# Patient Record
Sex: FEMALE
[De-identification: names, ages, dates, MRNs, and addresses within clinical notes are randomized per-mention and may not be internally consistent; named-entity substitution may affect disease eponyms.]

---
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
## 2023-01-27 ENCOUNTER — OFFICE (OUTPATIENT)
Dept: URBAN - METROPOLITAN AREA CLINIC 104 | Facility: CLINIC | Age: 18
Setting detail: OPHTHALMOLOGY
End: 2023-01-27
Payer: MEDICAID

## 2023-01-27 DIAGNOSIS — H20.823: ICD-10-CM

## 2023-01-27 DIAGNOSIS — H16.222: ICD-10-CM

## 2023-01-27 DIAGNOSIS — H25.13: ICD-10-CM

## 2023-01-27 DIAGNOSIS — H25.12: ICD-10-CM

## 2023-01-27 DIAGNOSIS — H44.113: ICD-10-CM

## 2023-01-27 PROBLEM — H21.543 POSTERIOR SYNECHIAE OF IRIS; BOTH EYES: Status: ACTIVE | Noted: 2023-01-27

## 2023-01-27 PROBLEM — H25.11 CATARACT SENILE NUCLEAR SCLEROSIS; RIGHT EYE, LEFT EYE, BOTH EYES: Status: ACTIVE | Noted: 2023-01-27

## 2023-01-27 PROBLEM — H26.20 COMPLICATED CATARACT, NOS ; LEFT EYE: Status: ACTIVE | Noted: 2023-01-27

## 2023-01-27 PROCEDURE — 76519 ECHO EXAM OF EYE: CPT | Performed by: OPHTHALMOLOGY

## 2023-01-27 PROCEDURE — 99214 OFFICE O/P EST MOD 30 MIN: CPT | Performed by: OPHTHALMOLOGY

## 2023-01-27 ASSESSMENT — REFRACTION_MANIFEST
OD_SPHERE: PLANO
OS_SPHERE: PLANO
OD_VA1: 20/CF
OS_VA1: 20/HM

## 2023-01-27 ASSESSMENT — KERATOMETRY
OD_K1POWER_DIOPTERS: 42.56
OS_K2POWER_DIOPTERS: 46.17
OD_K2POWER_DIOPTERS: 46.49
OD_K1K2_AVERAGE: 44.525
OS_CYLPOWER_DEGREES: 3.39
OD_AXISANGLE_DEGREES: 172
OS_K1K2_AVERAGE: 44.475
OS_AXISANGLE_DEGREES: 180
OD_CYLPOWER_DEGREES: 3.93
OS_K1POWER_DIOPTERS: 42.78
OS_CYLAXISANGLE_DEGREES: 90
OS_AXISANGLE2_DEGREES: 90
OD_AXISANGLE2_DEGREES: 82
OD_CYLAXISANGLE_DEGREES: 82

## 2023-01-27 ASSESSMENT — VISUAL ACUITY
OD_BCVA: 20/HM
OS_BCVA: 20/CF

## 2023-01-27 ASSESSMENT — CONFRONTATIONAL VISUAL FIELD TEST (CVF)
OS_FINDINGS: FULL
OD_FINDINGS: FULL

## 2023-01-27 ASSESSMENT — SUPERFICIAL PUNCTATE KERATITIS (SPK): OS_SPK: 2+

## 2023-02-27 ENCOUNTER — OFFICE (OUTPATIENT)
Dept: URBAN - METROPOLITAN AREA CLINIC 104 | Facility: CLINIC | Age: 18
Setting detail: OPHTHALMOLOGY
End: 2023-02-27
Payer: MEDICAID

## 2023-02-27 DIAGNOSIS — H25.12: ICD-10-CM

## 2023-02-27 DIAGNOSIS — H44.113: ICD-10-CM

## 2023-02-27 DIAGNOSIS — H26.20: ICD-10-CM

## 2023-02-27 DIAGNOSIS — H25.13: ICD-10-CM

## 2023-02-27 DIAGNOSIS — H16.222: ICD-10-CM

## 2023-02-27 DIAGNOSIS — H25.11: ICD-10-CM

## 2023-02-27 DIAGNOSIS — H20.823: ICD-10-CM

## 2023-02-27 DIAGNOSIS — H21.543: ICD-10-CM

## 2023-02-27 PROCEDURE — 99213 OFFICE O/P EST LOW 20 MIN: CPT | Performed by: OPHTHALMOLOGY

## 2023-02-27 ASSESSMENT — KERATOMETRY
OS_K1POWER_DIOPTERS: 42.72
OS_AXISANGLE_DEGREES: 093
OD_K2POWER_DIOPTERS: 46.49
OD_K1POWER_DIOPTERS: 42.56
OS_K2POWER_DIOPTERS: 46.23
OD_AXISANGLE_DEGREES: 084

## 2023-02-27 ASSESSMENT — REFRACTION_MANIFEST
OS_SPHERE: PLANO
OD_SPHERE: PLANO
OS_VA1: 20/HM
OD_VA1: 20/CF

## 2023-02-27 ASSESSMENT — VISUAL ACUITY
OD_BCVA: 20/HM
OS_BCVA: 20/CF

## 2023-02-27 ASSESSMENT — CONFRONTATIONAL VISUAL FIELD TEST (CVF)
OD_FINDINGS: FULL
OS_FINDINGS: FULL

## 2023-02-27 ASSESSMENT — REFRACTION_AUTOREFRACTION
OS_SPHERE: UNABLE
OD_SPHERE: UNABLE

## 2023-02-27 ASSESSMENT — SUPERFICIAL PUNCTATE KERATITIS (SPK): OS_SPK: 2+

## 2023-03-15 ENCOUNTER — RX ONLY (RX ONLY)
Age: 18
End: 2023-03-15

## 2023-03-15 ENCOUNTER — AMBUL SURGICAL CARE (OUTPATIENT)
Dept: URBAN - METROPOLITAN AREA CLINIC 18 | Facility: CLINIC | Age: 18
Setting detail: OPHTHALMOLOGY
End: 2023-03-15
Payer: MEDICAID

## 2023-03-15 DIAGNOSIS — H21.543: ICD-10-CM

## 2023-03-15 DIAGNOSIS — H26.20: ICD-10-CM

## 2023-03-15 PROCEDURE — 66982 XCAPSL CTRC RMVL CPLX WO ECP: CPT | Performed by: OPHTHALMOLOGY

## 2023-03-15 PROCEDURE — 65865 INCISE INNER EYE ADHESIONS: CPT | Performed by: OPHTHALMOLOGY

## 2023-03-16 ENCOUNTER — OFFICE (OUTPATIENT)
Dept: URBAN - METROPOLITAN AREA CLINIC 104 | Facility: CLINIC | Age: 18
Setting detail: OPHTHALMOLOGY
End: 2023-03-16
Payer: MEDICAID

## 2023-03-16 DIAGNOSIS — Z96.1: ICD-10-CM

## 2023-03-16 PROCEDURE — 99024 POSTOP FOLLOW-UP VISIT: CPT | Performed by: OPTOMETRIST

## 2023-03-16 ASSESSMENT — CONFRONTATIONAL VISUAL FIELD TEST (CVF)
OS_FINDINGS: FULL
OD_FINDINGS: FULL

## 2023-03-16 ASSESSMENT — REFRACTION_MANIFEST
OD_VA1: 20/CF
OD_SPHERE: PLANO
OS_VA1: 20/HM
OS_SPHERE: PLANO

## 2023-03-16 ASSESSMENT — KERATOMETRY
OD_K1POWER_DIOPTERS: 42.56
OS_K1POWER_DIOPTERS: UNABLE
OD_K2POWER_DIOPTERS: 45.98
OD_AXISANGLE_DEGREES: 083

## 2023-03-16 ASSESSMENT — SUPERFICIAL PUNCTATE KERATITIS (SPK): OS_SPK: 2+

## 2023-03-16 ASSESSMENT — CORNEAL EDEMA - MICROCYSTIC EPITHELIAL EDEMA (MCE): OS_MCE: 2+ 3+

## 2023-03-16 ASSESSMENT — VISUAL ACUITY
OS_BCVA: 20/CF
OD_BCVA: 20/300

## 2023-03-16 ASSESSMENT — REFRACTION_AUTOREFRACTION
OS_SPHERE: UNABLE
OD_SPHERE: UNABLE

## 2023-03-20 ENCOUNTER — OFFICE (OUTPATIENT)
Dept: URBAN - METROPOLITAN AREA CLINIC 104 | Facility: CLINIC | Age: 18
Setting detail: OPHTHALMOLOGY
End: 2023-03-20
Payer: MEDICAID

## 2023-03-20 DIAGNOSIS — Z96.1: ICD-10-CM

## 2023-03-20 PROCEDURE — 99024 POSTOP FOLLOW-UP VISIT: CPT | Performed by: OPHTHALMOLOGY

## 2023-03-20 ASSESSMENT — REFRACTION_MANIFEST
OD_VA1: 20/CF
OD_SPHERE: PLANO

## 2023-03-20 ASSESSMENT — VISUAL ACUITY
OD_BCVA: 20/80
OS_BCVA: 20/CF

## 2023-03-20 ASSESSMENT — KERATOMETRY
OD_AXISANGLE_DEGREES: 084
OS_K2POWER_DIOPTERS: 46.55
OS_AXISANGLE_DEGREES: 097
OD_K1POWER_DIOPTERS: 42.78
OD_K2POWER_DIOPTERS: 46.62
OS_K1POWER_DIOPTERS: 42.67

## 2023-03-20 ASSESSMENT — CONFRONTATIONAL VISUAL FIELD TEST (CVF)
OS_FINDINGS: FULL
OD_FINDINGS: FULL

## 2023-03-20 ASSESSMENT — CORNEAL EDEMA - MICROCYSTIC EPITHELIAL EDEMA (MCE): OS_MCE: 2+ 3+

## 2023-03-20 ASSESSMENT — SUPERFICIAL PUNCTATE KERATITIS (SPK): OS_SPK: 2+

## 2023-03-20 ASSESSMENT — REFRACTION_AUTOREFRACTION
OS_AXIS: 007
OS_SPHERE: +1.00
OD_SPHERE: UNABLE
OS_CYLINDER: -2.75

## 2023-03-20 ASSESSMENT — SPHEQUIV_DERIVED: OS_SPHEQUIV: -0.375

## 2023-03-20 ASSESSMENT — TONOMETRY: OS_IOP_MMHG: 15

## 2023-03-20 ASSESSMENT — AXIALLENGTH_DERIVED: OS_AL: 23.3332

## 2023-04-03 ENCOUNTER — OFFICE (OUTPATIENT)
Dept: URBAN - METROPOLITAN AREA CLINIC 104 | Facility: CLINIC | Age: 18
Setting detail: OPHTHALMOLOGY
End: 2023-04-03
Payer: MEDICAID

## 2023-04-03 DIAGNOSIS — Z96.1: ICD-10-CM

## 2023-04-03 PROCEDURE — 99024 POSTOP FOLLOW-UP VISIT: CPT | Performed by: OPHTHALMOLOGY

## 2023-04-03 ASSESSMENT — KERATOMETRY
OD_K1POWER_DIOPTERS: 42.67
OS_K1POWER_DIOPTERS: 43.05
OS_AXISANGLE_DEGREES: 096
OD_AXISANGLE_DEGREES: 087
OS_K2POWER_DIOPTERS: 46.11
OD_K2POWER_DIOPTERS: 46.62

## 2023-04-03 ASSESSMENT — REFRACTION_MANIFEST
OD_VA1: 20/CF
OS_SPHERE: +1.00
OS_AXIS: 170
OD_SPHERE: PLANO
OS_CYLINDER: -2.25
OS_VA1: 20/40

## 2023-04-03 ASSESSMENT — REFRACTION_AUTOREFRACTION
OS_AXIS: 170
OD_SPHERE: UNABLE
OS_CYLINDER: -2.25
OS_SPHERE: +0.75

## 2023-04-03 ASSESSMENT — TONOMETRY
OS_IOP_MMHG: 14
OD_IOP_MMHG: 14

## 2023-04-03 ASSESSMENT — VISUAL ACUITY: OD_BCVA: 20/60+

## 2023-04-03 ASSESSMENT — SUPERFICIAL PUNCTATE KERATITIS (SPK): OS_SPK: 2+

## 2023-04-03 ASSESSMENT — SPHEQUIV_DERIVED
OS_SPHEQUIV: -0.125
OS_SPHEQUIV: -0.375

## 2023-04-03 ASSESSMENT — AXIALLENGTH_DERIVED
OS_AL: 23.34
OS_AL: 23.249

## 2023-04-03 ASSESSMENT — CONFRONTATIONAL VISUAL FIELD TEST (CVF)
OS_FINDINGS: FULL
OD_FINDINGS: FULL

## 2023-04-03 ASSESSMENT — CORNEAL EDEMA - MICROCYSTIC EPITHELIAL EDEMA (MCE): OS_MCE: 2+ 3+

## 2023-04-11 ENCOUNTER — OFFICE (OUTPATIENT)
Dept: URBAN - METROPOLITAN AREA CLINIC 94 | Facility: CLINIC | Age: 18
Setting detail: OPHTHALMOLOGY
End: 2023-04-11
Payer: MEDICAID

## 2023-04-11 DIAGNOSIS — H20.823: ICD-10-CM

## 2023-04-11 DIAGNOSIS — H25.11: ICD-10-CM

## 2023-04-11 DIAGNOSIS — H44.113: ICD-10-CM

## 2023-04-11 PROCEDURE — 99024 POSTOP FOLLOW-UP VISIT: CPT | Performed by: OPHTHALMOLOGY

## 2023-04-11 PROCEDURE — 92134 CPTRZ OPH DX IMG PST SGM RTA: CPT | Performed by: OPHTHALMOLOGY

## 2023-04-11 ASSESSMENT — TONOMETRY
OS_IOP_MMHG: 18
OD_IOP_MMHG: 21

## 2023-04-11 ASSESSMENT — SPHEQUIV_DERIVED: OS_SPHEQUIV: -0.375

## 2023-04-11 ASSESSMENT — KERATOMETRY
OD_AXISANGLE_DEGREES: 087
OS_K1POWER_DIOPTERS: 43.05
OD_K1POWER_DIOPTERS: 42.67
OD_K2POWER_DIOPTERS: 46.62
OS_K2POWER_DIOPTERS: 46.11
OS_AXISANGLE_DEGREES: 096

## 2023-04-11 ASSESSMENT — AXIALLENGTH_DERIVED: OS_AL: 23.34

## 2023-04-11 ASSESSMENT — CORNEAL EDEMA - MICROCYSTIC EPITHELIAL EDEMA (MCE): OS_MCE: 2+ 3+

## 2023-04-11 ASSESSMENT — SUPERFICIAL PUNCTATE KERATITIS (SPK): OS_SPK: 2+

## 2023-04-11 ASSESSMENT — REFRACTION_AUTOREFRACTION
OS_AXIS: 170
OD_SPHERE: UNABLE
OS_SPHERE: +0.75
OS_CYLINDER: -2.25

## 2023-04-11 ASSESSMENT — VISUAL ACUITY
OS_BCVA: CF 1FT
OD_BCVA: 20/70

## 2023-04-11 ASSESSMENT — CONFRONTATIONAL VISUAL FIELD TEST (CVF)
OD_FINDINGS: FULL
OS_FINDINGS: FULL

## 2023-05-12 ENCOUNTER — OFFICE (OUTPATIENT)
Dept: URBAN - METROPOLITAN AREA CLINIC 104 | Facility: CLINIC | Age: 18
Setting detail: OPHTHALMOLOGY
End: 2023-05-12
Payer: MEDICAID

## 2023-05-12 DIAGNOSIS — Z96.1: ICD-10-CM

## 2023-05-12 PROCEDURE — 99024 POSTOP FOLLOW-UP VISIT: CPT | Performed by: OPHTHALMOLOGY

## 2023-05-12 ASSESSMENT — VISUAL ACUITY
OS_BCVA: 20/CF
OD_BCVA: 20/50-

## 2023-05-12 ASSESSMENT — REFRACTION_AUTOREFRACTION
OS_SPHERE: UNABLE
OD_SPHERE: UNABLE

## 2023-05-12 ASSESSMENT — CONFRONTATIONAL VISUAL FIELD TEST (CVF)
OS_FINDINGS: FULL
OD_FINDINGS: FULL

## 2023-05-12 ASSESSMENT — KERATOMETRY
OS_K2POWER_DIOPTERS: 45.25
OD_K1POWER_DIOPTERS: 42.50
OS_K1POWER_DIOPTERS: 42.25
OD_K2POWER_DIOPTERS: 45.50
OD_AXISANGLE_DEGREES: 082
OS_AXISANGLE_DEGREES: 090

## 2023-05-12 ASSESSMENT — SUPERFICIAL PUNCTATE KERATITIS (SPK): OS_SPK: 2+

## 2023-05-12 ASSESSMENT — TONOMETRY
OD_IOP_MMHG: 12
OS_IOP_MMHG: 12

## 2023-05-12 ASSESSMENT — CORNEAL EDEMA - MICROCYSTIC EPITHELIAL EDEMA (MCE): OS_MCE: ABSENT

## 2023-06-23 ENCOUNTER — OFFICE (OUTPATIENT)
Dept: URBAN - METROPOLITAN AREA CLINIC 104 | Facility: CLINIC | Age: 18
Setting detail: OPHTHALMOLOGY
End: 2023-06-23
Payer: MEDICAID

## 2023-06-23 ENCOUNTER — RX ONLY (RX ONLY)
Age: 18
End: 2023-06-23

## 2023-06-23 DIAGNOSIS — H16.222: ICD-10-CM

## 2023-06-23 DIAGNOSIS — H21.543: ICD-10-CM

## 2023-06-23 DIAGNOSIS — H20.823: ICD-10-CM

## 2023-06-23 DIAGNOSIS — Z96.1: ICD-10-CM

## 2023-06-23 DIAGNOSIS — H44.113: ICD-10-CM

## 2023-06-23 DIAGNOSIS — H26.20: ICD-10-CM

## 2023-06-23 PROCEDURE — 99213 OFFICE O/P EST LOW 20 MIN: CPT | Performed by: OPHTHALMOLOGY

## 2023-06-23 ASSESSMENT — CONFRONTATIONAL VISUAL FIELD TEST (CVF)
OS_FINDINGS: FULL
OD_FINDINGS: FULL

## 2023-06-23 ASSESSMENT — REFRACTION_AUTOREFRACTION
OS_CYLINDER: -2.00
OD_CYLINDER: -2.50
OD_AXIS: 110
OD_SPHERE: -7.50
OS_AXIS: 002
OS_SPHERE: -0.50

## 2023-06-23 ASSESSMENT — SPHEQUIV_DERIVED
OS_SPHEQUIV: -1.5
OD_SPHEQUIV: -8.75

## 2023-06-23 ASSESSMENT — REFRACTION_MANIFEST
OS_SPHERE: PLANO
OD_SPHERE: UNABLE
OS_AXIS: 05
OS_CYLINDER: -1.25
OS_VA1: 20/30

## 2023-06-23 ASSESSMENT — KERATOMETRY
OD_K1POWER_DIOPTERS: UNABLE
OS_K1POWER_DIOPTERS: UNABLE

## 2023-06-23 ASSESSMENT — SUPERFICIAL PUNCTATE KERATITIS (SPK): OS_SPK: 2+

## 2023-06-23 ASSESSMENT — CORNEAL EDEMA - MICROCYSTIC EPITHELIAL EDEMA (MCE): OS_MCE: ABSENT

## 2023-06-23 ASSESSMENT — VISUAL ACUITY
OD_BCVA: 20/40
OS_BCVA: 20/CF

## 2023-06-23 ASSESSMENT — TONOMETRY
OD_IOP_MMHG: 12
OS_IOP_MMHG: 12

## 2023-07-19 ENCOUNTER — AMBUL SURGICAL CARE (OUTPATIENT)
Dept: URBAN - METROPOLITAN AREA CLINIC 18 | Facility: CLINIC | Age: 18
Setting detail: OPHTHALMOLOGY
End: 2023-07-19
Payer: MEDICAID

## 2023-07-19 ENCOUNTER — RX ONLY (RX ONLY)
Age: 18
End: 2023-07-19

## 2023-07-19 DIAGNOSIS — H26.20: ICD-10-CM

## 2023-07-19 DIAGNOSIS — H21.543: ICD-10-CM

## 2023-07-19 PROCEDURE — 66982 XCAPSL CTRC RMVL CPLX WO ECP: CPT | Performed by: OPHTHALMOLOGY

## 2023-07-20 ENCOUNTER — OFFICE (OUTPATIENT)
Dept: URBAN - METROPOLITAN AREA CLINIC 104 | Facility: CLINIC | Age: 18
Setting detail: OPHTHALMOLOGY
End: 2023-07-20
Payer: MEDICAID

## 2023-07-20 DIAGNOSIS — Z96.1: ICD-10-CM

## 2023-07-20 PROCEDURE — 99024 POSTOP FOLLOW-UP VISIT: CPT | Performed by: OPTOMETRIST

## 2023-07-20 ASSESSMENT — AXIALLENGTH_DERIVED: OD_AL: 23.62

## 2023-07-20 ASSESSMENT — REFRACTION_MANIFEST
OS_CYLINDER: -1.25
OS_VA1: 20/30
OD_SPHERE: UNABLE
OS_SPHERE: PLANO
OS_AXIS: 05

## 2023-07-20 ASSESSMENT — KERATOMETRY
OS_K2POWER_DIOPTERS: 46.42
OD_AXISANGLE_DEGREES: 092
OD_K2POWER_DIOPTERS: 46.88
OD_K1POWER_DIOPTERS: 42.88
OS_K1POWER_DIOPTERS: 42.83
OS_AXISANGLE_DEGREES: 090

## 2023-07-20 ASSESSMENT — REFRACTION_AUTOREFRACTION
OS_SPHERE: UNABLE
OD_CYLINDER: -4.75
OD_AXIS: 004
OD_SPHERE: +1.00

## 2023-07-20 ASSESSMENT — VISUAL ACUITY
OD_BCVA: 20/40
OS_BCVA: 20/350

## 2023-07-20 ASSESSMENT — SPHEQUIV_DERIVED: OD_SPHEQUIV: -1.375

## 2023-07-20 ASSESSMENT — CORNEAL EDEMA - MICROCYSTIC EPITHELIAL EDEMA (MCE): OS_MCE: ABSENT

## 2023-07-20 ASSESSMENT — SUPERFICIAL PUNCTATE KERATITIS (SPK): OS_SPK: 2+

## 2023-07-31 ENCOUNTER — OFFICE (OUTPATIENT)
Dept: URBAN - METROPOLITAN AREA CLINIC 104 | Facility: CLINIC | Age: 18
Setting detail: OPHTHALMOLOGY
End: 2023-07-31
Payer: MEDICAID

## 2023-07-31 DIAGNOSIS — Z96.1: ICD-10-CM

## 2023-07-31 PROCEDURE — 99024 POSTOP FOLLOW-UP VISIT: CPT | Performed by: OPHTHALMOLOGY

## 2023-07-31 ASSESSMENT — REFRACTION_AUTOREFRACTION
OD_AXIS: 0
OD_SPHERE: +0.50
OS_SPHERE: UNABLE
OD_CYLINDER: -3.50

## 2023-07-31 ASSESSMENT — SPHEQUIV_DERIVED: OD_SPHEQUIV: -1.25

## 2023-07-31 ASSESSMENT — CONFRONTATIONAL VISUAL FIELD TEST (CVF)
OS_FINDINGS: FULL
OD_FINDINGS: FULL

## 2023-07-31 ASSESSMENT — VISUAL ACUITY
OS_BCVA: 20/200
OD_BCVA: 20/30

## 2023-07-31 ASSESSMENT — SUPERFICIAL PUNCTATE KERATITIS (SPK): OS_SPK: 2+

## 2023-07-31 ASSESSMENT — TONOMETRY
OS_IOP_MMHG: 16
OD_IOP_MMHG: 16

## 2023-07-31 ASSESSMENT — CORNEAL EDEMA - MICROCYSTIC EPITHELIAL EDEMA (MCE): OS_MCE: ABSENT

## 2023-08-14 ENCOUNTER — OFFICE (OUTPATIENT)
Dept: URBAN - METROPOLITAN AREA CLINIC 104 | Facility: CLINIC | Age: 18
Setting detail: OPHTHALMOLOGY
End: 2023-08-14
Payer: MEDICAID

## 2023-08-14 DIAGNOSIS — H44.113: ICD-10-CM

## 2023-08-14 DIAGNOSIS — Z96.1: ICD-10-CM

## 2023-08-14 DIAGNOSIS — H20.823: ICD-10-CM

## 2023-08-14 PROCEDURE — 99024 POSTOP FOLLOW-UP VISIT: CPT | Performed by: OPHTHALMOLOGY

## 2023-08-14 ASSESSMENT — CORNEAL EDEMA CLINICAL DESCRIPTION: OD_CORNEALEDEMA: ABSENT

## 2023-08-14 ASSESSMENT — KERATOMETRY
OS_AXISANGLE_DEGREES: 093
OS_K2POWER_DIOPTERS: 46.36
OS_K1POWER_DIOPTERS: 42.88
OD_K2POWER_DIOPTERS: 46.88
OD_AXISANGLE_DEGREES: 083
OD_K1POWER_DIOPTERS: 42.78

## 2023-08-14 ASSESSMENT — REFRACTION_AUTOREFRACTION
OD_AXIS: 177
OS_SPHERE: +0.50
OS_CYLINDER: -4.00
OS_AXIS: 179
OD_SPHERE: +0.75
OD_CYLINDER: -4.00

## 2023-08-14 ASSESSMENT — AXIALLENGTH_DERIVED
OS_AL: 23.77
OD_AL: 23.5904

## 2023-08-14 ASSESSMENT — VISUAL ACUITY
OS_BCVA: 20/200
OD_BCVA: 20/30-1

## 2023-08-14 ASSESSMENT — CONFRONTATIONAL VISUAL FIELD TEST (CVF)
OS_FINDINGS: FULL
OD_FINDINGS: FULL

## 2023-08-14 ASSESSMENT — TONOMETRY
OD_IOP_MMHG: 18
OS_IOP_MMHG: 15

## 2023-08-14 ASSESSMENT — CORNEAL EDEMA - MICROCYSTIC EPITHELIAL EDEMA (MCE): OS_MCE: ABSENT

## 2023-08-14 ASSESSMENT — SUPERFICIAL PUNCTATE KERATITIS (SPK): OS_SPK: 2+

## 2023-08-14 ASSESSMENT — SPHEQUIV_DERIVED
OS_SPHEQUIV: -1.5
OD_SPHEQUIV: -1.25

## 2023-09-18 ENCOUNTER — OFFICE (OUTPATIENT)
Dept: URBAN - METROPOLITAN AREA CLINIC 104 | Facility: CLINIC | Age: 18
Setting detail: OPHTHALMOLOGY
End: 2023-09-18
Payer: MEDICAID

## 2023-09-18 DIAGNOSIS — H52.4: ICD-10-CM

## 2023-09-18 DIAGNOSIS — H44.113: ICD-10-CM

## 2023-09-18 DIAGNOSIS — H20.823: ICD-10-CM

## 2023-09-18 DIAGNOSIS — Z96.1: ICD-10-CM

## 2023-09-18 PROCEDURE — 92015 DETERMINE REFRACTIVE STATE: CPT | Performed by: OPHTHALMOLOGY

## 2023-09-18 PROCEDURE — 99024 POSTOP FOLLOW-UP VISIT: CPT | Performed by: OPHTHALMOLOGY

## 2023-09-18 ASSESSMENT — TONOMETRY
OS_IOP_MMHG: 12
OD_IOP_MMHG: 11

## 2023-09-18 ASSESSMENT — AXIALLENGTH_DERIVED
OD_AL: 23.5062
OS_AL: 23.44
OS_AL: 23.73
OD_AL: 23.5062

## 2023-09-18 ASSESSMENT — REFRACTION_MANIFEST
OD_ADD: +2.75
OD_CYLINDER: -3.00
OS_ADD: +2.75
OD_AXIS: 170
OS_CYLINDER: -2.50
OS_SPHERE: +0.50
OD_VA1: 20/80
OS_VA1: 20/30-
OD_SPHERE: +0.50
OS_AXIS: 180

## 2023-09-18 ASSESSMENT — REFRACTION_AUTOREFRACTION
OD_AXIS: 168
OS_AXIS: 179
OD_SPHERE: +0.75
OS_CYLINDER: -4.00
OD_CYLINDER: -3.50
OS_SPHERE: +0.50

## 2023-09-18 ASSESSMENT — KERATOMETRY
OD_K2POWER_DIOPTERS: 46.81
OD_K1POWER_DIOPTERS: 42.78
OS_K1POWER_DIOPTERS: 42.94
OS_K2POWER_DIOPTERS: 46.49
OS_AXISANGLE_DEGREES: 090
OD_AXISANGLE_DEGREES: 083

## 2023-09-18 ASSESSMENT — SPHEQUIV_DERIVED
OD_SPHEQUIV: -1
OS_SPHEQUIV: -1.5
OD_SPHEQUIV: -1
OS_SPHEQUIV: -0.75

## 2023-09-18 ASSESSMENT — VISUAL ACUITY
OD_BCVA: 20/40
OS_BCVA: 20/200

## 2023-09-18 ASSESSMENT — CONFRONTATIONAL VISUAL FIELD TEST (CVF)
OS_FINDINGS: FULL
OD_FINDINGS: FULL

## 2023-09-18 ASSESSMENT — CORNEAL EDEMA - MICROCYSTIC EPITHELIAL EDEMA (MCE): OS_MCE: ABSENT

## 2023-09-18 ASSESSMENT — SUPERFICIAL PUNCTATE KERATITIS (SPK): OS_SPK: 2+

## 2023-09-18 ASSESSMENT — CORNEAL EDEMA CLINICAL DESCRIPTION: OD_CORNEALEDEMA: ABSENT

## 2023-09-19 ENCOUNTER — OFFICE (OUTPATIENT)
Dept: URBAN - METROPOLITAN AREA CLINIC 94 | Facility: CLINIC | Age: 18
Setting detail: OPHTHALMOLOGY
End: 2023-09-19
Payer: MEDICAID

## 2023-09-19 DIAGNOSIS — H20.823: ICD-10-CM

## 2023-09-19 DIAGNOSIS — H44.113: ICD-10-CM

## 2023-09-19 PROCEDURE — 92012 INTRM OPH EXAM EST PATIENT: CPT | Performed by: OPHTHALMOLOGY

## 2023-09-19 PROCEDURE — 92134 CPTRZ OPH DX IMG PST SGM RTA: CPT | Performed by: OPHTHALMOLOGY

## 2023-09-19 ASSESSMENT — SUPERFICIAL PUNCTATE KERATITIS (SPK): OS_SPK: 2+

## 2023-09-19 ASSESSMENT — SPHEQUIV_DERIVED
OS_SPHEQUIV: -1.5
OD_SPHEQUIV: -1

## 2023-09-19 ASSESSMENT — KERATOMETRY
OD_K2POWER_DIOPTERS: 46.81
OS_K2POWER_DIOPTERS: 46.49
OD_K1POWER_DIOPTERS: 42.78
OD_AXISANGLE_DEGREES: 083
OS_AXISANGLE_DEGREES: 090
OS_K1POWER_DIOPTERS: 42.94

## 2023-09-19 ASSESSMENT — REFRACTION_AUTOREFRACTION
OD_AXIS: 168
OD_CYLINDER: -3.50
OS_SPHERE: +0.50
OD_SPHERE: +0.75
OS_CYLINDER: -4.00
OS_AXIS: 179

## 2023-09-19 ASSESSMENT — VISUAL ACUITY
OD_BCVA: 20/40
OS_BCVA: 20/200

## 2023-09-19 ASSESSMENT — TONOMETRY
OS_IOP_MMHG: 18
OD_IOP_MMHG: 17

## 2023-09-19 ASSESSMENT — AXIALLENGTH_DERIVED
OD_AL: 23.5062
OS_AL: 23.73

## 2023-09-19 ASSESSMENT — CORNEAL EDEMA CLINICAL DESCRIPTION: OD_CORNEALEDEMA: ABSENT

## 2023-09-19 ASSESSMENT — CORNEAL EDEMA - MICROCYSTIC EPITHELIAL EDEMA (MCE): OS_MCE: ABSENT

## 2023-11-17 ENCOUNTER — OFFICE (OUTPATIENT)
Dept: URBAN - METROPOLITAN AREA CLINIC 104 | Facility: CLINIC | Age: 18
Setting detail: OPHTHALMOLOGY
End: 2023-11-17
Payer: MEDICAID

## 2023-11-17 DIAGNOSIS — H16.222: ICD-10-CM

## 2023-11-17 DIAGNOSIS — H21.543: ICD-10-CM

## 2023-11-17 DIAGNOSIS — Z96.1: ICD-10-CM

## 2023-11-17 DIAGNOSIS — H20.823: ICD-10-CM

## 2023-11-17 DIAGNOSIS — H26.493: ICD-10-CM

## 2023-11-17 DIAGNOSIS — H44.113: ICD-10-CM

## 2023-11-17 PROCEDURE — 92012 INTRM OPH EXAM EST PATIENT: CPT | Performed by: OPHTHALMOLOGY

## 2023-11-17 ASSESSMENT — CORNEAL EDEMA CLINICAL DESCRIPTION: OD_CORNEALEDEMA: ABSENT

## 2023-11-17 ASSESSMENT — CORNEAL EDEMA - MICROCYSTIC EPITHELIAL EDEMA (MCE): OS_MCE: ABSENT

## 2023-11-17 ASSESSMENT — CONFRONTATIONAL VISUAL FIELD TEST (CVF)
OD_FINDINGS: FULL
OS_FINDINGS: FULL

## 2023-11-17 ASSESSMENT — SUPERFICIAL PUNCTATE KERATITIS (SPK): OS_SPK: 2+

## 2023-11-28 ASSESSMENT — REFRACTION_AUTOREFRACTION
OS_CYLINDER: -4.25
OD_CYLINDER: -3.00
OS_AXIS: 173
OD_AXIS: 171
OS_SPHERE: -1.00
OD_SPHERE: +0.50

## 2023-11-28 ASSESSMENT — SPHEQUIV_DERIVED
OS_SPHEQUIV: -3.125
OD_SPHEQUIV: -1